# Patient Record
Sex: MALE | Race: WHITE | URBAN - METROPOLITAN AREA
[De-identification: names, ages, dates, MRNs, and addresses within clinical notes are randomized per-mention and may not be internally consistent; named-entity substitution may affect disease eponyms.]

---

## 2019-04-25 ENCOUNTER — OFFICE VISIT (OUTPATIENT)
Dept: ORTHOPEDIC SURGERY | Age: 34
End: 2019-04-25
Payer: COMMERCIAL

## 2019-04-25 VITALS
HEART RATE: 80 BPM | DIASTOLIC BLOOD PRESSURE: 83 MMHG | WEIGHT: 304 LBS | BODY MASS INDEX: 46.07 KG/M2 | HEIGHT: 68 IN | SYSTOLIC BLOOD PRESSURE: 136 MMHG

## 2019-04-25 DIAGNOSIS — M79.672 LEFT FOOT PAIN: Primary | ICD-10-CM

## 2019-04-25 DIAGNOSIS — S84.92XA NEURAPRAXIA OF LEFT LOWER EXTREMITY, INITIAL ENCOUNTER: ICD-10-CM

## 2019-04-25 PROCEDURE — 99203 OFFICE O/P NEW LOW 30 MIN: CPT | Performed by: PODIATRIST

## 2019-04-25 SDOH — HEALTH STABILITY: MENTAL HEALTH: HOW OFTEN DO YOU HAVE A DRINK CONTAINING ALCOHOL?: 2-4 TIMES A MONTH

## 2019-04-25 NOTE — PROGRESS NOTES
HISTORY OF PRESENT ILLNESS: This is an initial visit for a 47-year-old male presents with a work-related injury to his left lower leg ankle and foot. On March 28, he fell in his own office by tripping on a trash can lid. Apparently he fell completely to the ground injuring not only his left foot, ankle, and lower leg but also his hand, neck, and his head. He states that he was instructed to follow-up with his general practitioner however, he states that he does not have once a never followed up. He simply return to work and he feels that the amount of walking he does at work is greatly exacerbating his pain in all of these areas. Within the foot, he states that he has a burning and stabbing-type pain to the forefoot. With activity, he will sometimes have symptoms radiate into the 2nd and 3rd toes. The only thing that really helps this he states is him getting off his foot and elevating the foot. FAMILY HISTORY: Documented in chart. SOCIAL HISTORY: Documented in chart. REVIEW OF SYSTEMS:  The patient denies any issues with dermatologic, pulmonary, cardiovascular, genitourinary, hematologic, gastrointestinal, neurologic, psychiatric, and HEENT systems. Family History, Social History, and Review of Systems were reviewed from patient history form dated on 4/25/2019 and available in the patient's chart under the MEDIA tab. PHYSICAL EXAMINATION:     The patient is alert and orientated x3. The patient is obese. There is no obvious erythema ecchymosis or edema to his left lower leg, ankle, or foot. Gait evaluation does not demonstrate an antalgic gait. He has mild palpable tenderness over the dorsal aspect of the 2nd and 3rd metatarsal heads of the left foot. I'm not able to elicit any distinct paresthesias in the foot or toes. He has palpable pedal pulses bilateral.  His sensation is grossly intact bilateral.    The remainder of the exam is unremarkable.       RADIOGRAPHS: 3 weightbearing x-ray views of left foot were taken today. These demonstrate a shortened and 2nd metatarsal with chronic deformity of the 2nd metatarsal head and arthritic changes at the 2nd MTP. He otherwise does not demonstrate any acute fracture dislocation. ASSESSMENT: Neuropraxia left foot      PLAN: The patient was educated on the pathology and its treatment options. The description of his symptoms and his physical exam are consistent with the diagnosis of nerve injury. Unfortunately, there is not much in terms of treatment at this time since he does not have any consistent pain. We talked about avoidance of shoes that instigate the problem. Hopefully mainly with time the symptoms will resolve. I offered him a cortisone injection if he were to develop any consistent pain. We discussed the finding on the x-ray of the deformed 2nd metatarsal head. This suggests a Freiberg's infraction. That would be a pre-existing condition however, this injury may have exacerbated the symptoms. He can participate in activity and work as tolerated. I will see him back as needed.